# Patient Record
Sex: FEMALE | Race: BLACK OR AFRICAN AMERICAN | URBAN - METROPOLITAN AREA
[De-identification: names, ages, dates, MRNs, and addresses within clinical notes are randomized per-mention and may not be internally consistent; named-entity substitution may affect disease eponyms.]

---

## 2018-05-02 ENCOUNTER — OFFICE VISIT (OUTPATIENT)
Dept: FAMILY MEDICINE | Facility: CLINIC | Age: 28
End: 2018-05-02
Payer: COMMERCIAL

## 2018-05-02 VITALS
TEMPERATURE: 98.4 F | DIASTOLIC BLOOD PRESSURE: 66 MMHG | HEART RATE: 56 BPM | SYSTOLIC BLOOD PRESSURE: 102 MMHG | WEIGHT: 151 LBS

## 2018-05-02 DIAGNOSIS — Z32.01 PREGNANCY TEST POSITIVE: ICD-10-CM

## 2018-05-02 DIAGNOSIS — N92.6 MISSED PERIOD: Primary | ICD-10-CM

## 2018-05-02 LAB — BETA HCG QUAL IFA URINE: POSITIVE

## 2018-05-02 PROCEDURE — 84703 CHORIONIC GONADOTROPIN ASSAY: CPT | Performed by: FAMILY MEDICINE

## 2018-05-02 PROCEDURE — 99202 OFFICE O/P NEW SF 15 MIN: CPT | Performed by: FAMILY MEDICINE

## 2018-05-02 RX ORDER — PRENATAL VIT/IRON FUM/FOLIC AC 27MG-0.8MG
1 TABLET ORAL DAILY
Qty: 100 TABLET | Refills: 3 | Status: SHIPPED | OUTPATIENT
Start: 2018-05-02 | End: 2020-08-18

## 2018-05-02 NOTE — PROGRESS NOTES
SUBJECTIVE:   Salina Ulloa is a 27 year old female who presents to clinic today for the following health issues:      Concern - positive home preg test     Patient came today after she had her home pregnancy test positive.  Her last menstrual period was March 23.  She denies any current symptoms no vaginal bleeding no abdominal discomfort.  She had one pregnancy before which was uneventful.  She gave birth to her normal baby girl almost 3 years ago.  Denies any risk signs or symptoms.    Problem list and histories reviewed & adjusted, as indicated.      There is no problem list on file for this patient.    No past surgical history on file.    Social History   Substance Use Topics     Smoking status: Never Smoker     Smokeless tobacco: Never Used     Alcohol use No     No family history on file.      Current Outpatient Prescriptions   Medication Sig Dispense Refill     Prenatal Vit-Fe Fumarate-FA (PRENATAL MULTIVITAMIN PLUS IRON) 27-0.8 MG TABS per tablet Take 1 tablet by mouth daily 100 tablet 3       Reviewed and updated as needed this visit by clinical staff  Tobacco  Allergies  Meds  Soc Hx      Reviewed and updated as needed this visit by Provider         ROS:  CONSTITUTIONAL: NEGATIVE for fever, chills, change in weight  ENT/MOUTH: NEGATIVE for ear, mouth and throat problems  RESP: NEGATIVE for significant cough or SOB  CV: NEGATIVE for chest pain, palpitations or peripheral edema    OBJECTIVE:                                                    /66  Pulse 56  Temp 98.4  F (36.9  C) (Tympanic)  Wt 151 lb (68.5 kg)  LMP 03/23/2018  There is no height or weight on file to calculate BMI.   GENERAL: healthy, alert, well nourished, well hydrated, no distress  RESP: lungs clear to auscultation - no rales, no rhonchi, no wheezes  CV: regular rates and rhythm, normal S1 S2, no S3 or S4 and no murmur, no click or rub -         ASSESSMENT/PLAN:                                                         ICD-10-CM    1. Missed period N92.6 Beta HCG Qual, Urine - [facility] ([id number])     OB/GYN REFERRAL     Prenatal Vit-Fe Fumarate-FA (PRENATAL MULTIVITAMIN PLUS IRON) 27-0.8 MG TABS per tablet   2. Pregnancy test positive Z32.01 OB/GYN REFERRAL     Prenatal Vit-Fe Fumarate-FA (PRENATAL MULTIVITAMIN PLUS IRON) 27-0.8 MG TABS per tablet     Patient pregnancy test is positive.  We discussed about prenatal care.  She is referred to OB/GYN for further evaluation.  Meanwhile she is advised to keep herself hydrated and I will start her on prenatal vitamins.      Sylvester Clarke MD  [facility]

## 2018-05-02 NOTE — MR AVS SNAPSHOT
After Visit Summary   5/2/2018    Salina Ulloa    MRN: [id number]           Patient Information     Date Of Birth          1990        Visit Information        Provider Department      5/2/2018 11:00 AM Sylvester Clarke MD [facility] [place] [facility]        Today's Diagnoses     Missed period    -  1    Pregnancy test positive           Follow-ups after your visit        Additional Services     OB/GYN REFERRAL       Your provider has referred you to:  N: [facility] [phone]   [email or web address]    Please be aware that coverage of these services is subject to the terms and limitations of your health insurance plan.  Call member services at your health plan with any benefit or coverage questions.      Please bring the following to your appointment:  >>   Any x-rays, CTs or MRIs which have been performed.  Contact the facility where they were done to arrange for  prior to your scheduled appointment.  Any new CT, MRI or other procedures ordered by your specialist must be performed at a [facility] facility or coordinated by your clinic's referral office.    >>   List of current medications   >>   This referral request   >>   Any documents/labs given to you for this referral                  Who to contact     If you have questions or need follow up information about today's clinic visit or your schedule please contact [facility] directly at [phone].  Normal or non-critical lab and imaging results will be communicated to you by MyChart, letter or phone within 4 business days after the clinic has received the results. If you do not hear from us within 7 days, please contact the clinic through MyChart or phone. If you have a critical or abnormal lab result, we will notify you by phone as soon as possible.  Submit refill requests through [email or web address] or call your pharmacy and they will forward the refill request to us. Please allow 3 business days for  "your refill to be completed.          Additional Information About Your Visit        Catalyze[employer]CR Information     YouFig lets you send messages to your doctor, view your test results, renew your prescriptions, schedule appointments and more. To sign up, go to [email or web address] . Click on \"Log in\" on the left side of the screen, which will take you to the Welcome page. Then click on \"Sign up Now\" on the right side of the page.     You will be asked to enter the access code listed below, as well as some personal information. Please follow the directions to create your username and password.     Your access code is: [email or web address]  Expires: 2018 11:11 AM     Your access code will  in 90 days. If you need help or a new code, please call your [facility] clinic or [phone].        Care EveryWhere ID     This is your Care EveryWhere ID. This could be used by other organizations to access your [facility] medical records  [id number]        Your Vitals Were     Pulse Temperature Last Period             56 98.4  F (36.9  C) (Tympanic) 2018          Blood Pressure from Last 3 Encounters:   18 102/66    Weight from Last 3 Encounters:   18 151 lb (68.5 kg)              We Performed the Following     Beta HCG Qual, Urine - [facility] ([id number])     OB/GYN REFERRAL          Today's Medication Changes          These changes are accurate as of 18 11:11 AM.  If you have any questions, ask your nurse or doctor.               Start taking these medicines.        Dose/Directions    prenatal multivitamin plus iron 27-0.8 MG Tabs per tablet   Used for:  Pregnancy test positive, Missed period   Started by:  Sylvester Clarke MD        Dose:  1 tablet   Take 1 tablet by mouth daily   Quantity:  100 tablet   Refills:  3            Where to get your medicines      These medications were sent to [place] [address] [place] [facility]  [address]  [address] [place] [address]     Phone:  [phone] "     prenatal multivitamin plus iron 27-0.8 MG Tabs per tablet                Primary Care Provider Fax #    Provider Not In System [phone]                Equal Access to Services     HECTOR PARDO : Atnonia Ag, shara spangler, marlenyhansel songalexgissell phillipjana, cori schafereder phillipsanket elder laIsaacsamantha nicholson. [facility] [place] -[phone].    ATENCIÓN: Si habla español, tiene a kaiser disposición servicios gratuitos de asistencia lingüística. Llame al [phone].    We comply with applicable federal civil rights laws and [place] laws. We do not discriminate on the basis of race, color, national origin, age, disability, sex, sexual orientation, or gender identity.            Thank you!     Thank you for choosing [facility]  for your care. Our goal is always to provide you with excellent care. Hearing back from our patients is one way we can continue to improve our services. Please take a few minutes to complete the written survey that you may receive in the mail after your visit with us. Thank you!             Your Updated Medication List - Protect others around you: Learn how to safely use, store and throw away your medicines at www.disposemymeds.org.          This list is accurate as of 5/2/18 11:11 AM.  Always use your most recent med list.                   Brand Name Dispense Instructions for use Diagnosis    prenatal multivitamin plus iron 27-0.8 MG Tabs per tablet     100 tablet    Take 1 tablet by mouth daily    Pregnancy test positive, Missed period

## 2018-06-11 ENCOUNTER — TRANSFERRED RECORDS (OUTPATIENT)
Dept: HEALTH INFORMATION MANAGEMENT | Facility: CLINIC | Age: 28
End: 2018-06-11

## 2018-06-11 LAB
ABO + RH BLD: NORMAL
ABO + RH BLD: NORMAL
C TRACH DNA SPEC QL PROBE+SIG AMP: NEGATIVE
HBV SURFACE AG SERPL QL IA: NEGATIVE
HIV 1+2 AB+HIV1 P24 AG SERPL QL IA: NORMAL
N GONORRHOEA DNA SPEC QL PROBE+SIG AMP: NEGATIVE
RUBELLA ABY IGG: 5.93
RUBELLA ANTIBODY IGG QUANTITATIVE: NORMAL IU/ML
TREPONEMA ANTIBODIES: NORMAL

## 2018-09-17 LAB — T PALLIDUM IGG SER QL: NORMAL

## 2018-11-28 LAB — GROUP B STREP PCR: NEGATIVE

## 2018-12-16 ENCOUNTER — HOSPITAL ENCOUNTER (INPATIENT)
Facility: CLINIC | Age: 28
LOS: 2 days | Discharge: HOME | End: 2018-12-18
Attending: OBSTETRICS & GYNECOLOGY | Admitting: OBSTETRICS & GYNECOLOGY
Payer: COMMERCIAL

## 2018-12-16 LAB
ABO + RH BLD: NORMAL
ABO + RH BLD: NORMAL
RUPTURE OF FETAL MEMBRANES BY ROM PLUS: POSITIVE
SPECIMEN EXP DATE BLD: NORMAL
T PALLIDUM AB SER QL: NONREACTIVE

## 2018-12-16 PROCEDURE — 36415 COLL VENOUS BLD VENIPUNCTURE: CPT | Performed by: OBSTETRICS & GYNECOLOGY

## 2018-12-16 PROCEDURE — 86780 TREPONEMA PALLIDUM: CPT | Performed by: OBSTETRICS & GYNECOLOGY

## 2018-12-16 PROCEDURE — G0463 HOSPITAL OUTPT CLINIC VISIT: HCPCS | Mod: 25

## 2018-12-16 PROCEDURE — 84112 EVAL AMNIOTIC FLUID PROTEIN: CPT | Performed by: OBSTETRICS & GYNECOLOGY

## 2018-12-16 PROCEDURE — 86901 BLOOD TYPING SEROLOGIC RH(D): CPT | Performed by: OBSTETRICS & GYNECOLOGY

## 2018-12-16 PROCEDURE — 25000132 ZZH RX MED GY IP 250 OP 250 PS 637: Performed by: OBSTETRICS & GYNECOLOGY

## 2018-12-16 PROCEDURE — 86900 BLOOD TYPING SEROLOGIC ABO: CPT | Performed by: OBSTETRICS & GYNECOLOGY

## 2018-12-16 PROCEDURE — 72200001 ZZH LABOR CARE VAGINAL DELIVERY SINGLE

## 2018-12-16 PROCEDURE — 59025 FETAL NON-STRESS TEST: CPT

## 2018-12-16 PROCEDURE — 25000128 H RX IP 250 OP 636: Performed by: OBSTETRICS & GYNECOLOGY

## 2018-12-16 PROCEDURE — 12000037 ZZH R&B POSTPARTUM INTERMEDIATE

## 2018-12-16 RX ORDER — ACETAMINOPHEN 325 MG/1
650 TABLET ORAL EVERY 4 HOURS PRN
Status: DISCONTINUED | OUTPATIENT
Start: 2018-12-16 | End: 2018-12-16

## 2018-12-16 RX ORDER — BISACODYL 10 MG
10 SUPPOSITORY, RECTAL RECTAL DAILY PRN
Status: DISCONTINUED | OUTPATIENT
Start: 2018-12-18 | End: 2018-12-18 | Stop reason: HOSPADM

## 2018-12-16 RX ORDER — OXYTOCIN 10 [USP'U]/ML
10 INJECTION, SOLUTION INTRAMUSCULAR; INTRAVENOUS
Status: DISCONTINUED | OUTPATIENT
Start: 2018-12-16 | End: 2018-12-16

## 2018-12-16 RX ORDER — PRENATAL VIT/IRON FUM/FOLIC AC 27MG-0.8MG
1 TABLET ORAL DAILY
Status: DISCONTINUED | OUTPATIENT
Start: 2018-12-16 | End: 2018-12-18 | Stop reason: HOSPADM

## 2018-12-16 RX ORDER — HYDROCORTISONE 2.5 %
CREAM (GRAM) TOPICAL 3 TIMES DAILY PRN
Status: DISCONTINUED | OUTPATIENT
Start: 2018-12-16 | End: 2018-12-18 | Stop reason: HOSPADM

## 2018-12-16 RX ORDER — METHYLERGONOVINE MALEATE 0.2 MG/ML
200 INJECTION INTRAVENOUS
Status: DISCONTINUED | OUTPATIENT
Start: 2018-12-16 | End: 2018-12-16

## 2018-12-16 RX ORDER — AMOXICILLIN 250 MG
1 CAPSULE ORAL 2 TIMES DAILY
Status: DISCONTINUED | OUTPATIENT
Start: 2018-12-16 | End: 2018-12-18 | Stop reason: HOSPADM

## 2018-12-16 RX ORDER — NALOXONE HYDROCHLORIDE 0.4 MG/ML
.1-.4 INJECTION, SOLUTION INTRAMUSCULAR; INTRAVENOUS; SUBCUTANEOUS
Status: DISCONTINUED | OUTPATIENT
Start: 2018-12-16 | End: 2018-12-16

## 2018-12-16 RX ORDER — OXYTOCIN/0.9 % SODIUM CHLORIDE 30/500 ML
340 PLASTIC BAG, INJECTION (ML) INTRAVENOUS CONTINUOUS PRN
Status: DISCONTINUED | OUTPATIENT
Start: 2018-12-16 | End: 2018-12-18 | Stop reason: HOSPADM

## 2018-12-16 RX ORDER — OXYTOCIN 10 [USP'U]/ML
10 INJECTION, SOLUTION INTRAMUSCULAR; INTRAVENOUS
Status: DISCONTINUED | OUTPATIENT
Start: 2018-12-16 | End: 2018-12-18 | Stop reason: HOSPADM

## 2018-12-16 RX ORDER — SODIUM CHLORIDE, SODIUM LACTATE, POTASSIUM CHLORIDE, CALCIUM CHLORIDE 600; 310; 30; 20 MG/100ML; MG/100ML; MG/100ML; MG/100ML
INJECTION, SOLUTION INTRAVENOUS CONTINUOUS
Status: DISCONTINUED | OUTPATIENT
Start: 2018-12-16 | End: 2018-12-16

## 2018-12-16 RX ORDER — IBUPROFEN 400 MG/1
800 TABLET, FILM COATED ORAL
Status: DISCONTINUED | OUTPATIENT
Start: 2018-12-16 | End: 2018-12-16

## 2018-12-16 RX ORDER — ONDANSETRON 2 MG/ML
4 INJECTION INTRAMUSCULAR; INTRAVENOUS EVERY 6 HOURS PRN
Status: DISCONTINUED | OUTPATIENT
Start: 2018-12-16 | End: 2018-12-16

## 2018-12-16 RX ORDER — LANOLIN 100 %
OINTMENT (GRAM) TOPICAL
Status: DISCONTINUED | OUTPATIENT
Start: 2018-12-16 | End: 2018-12-18 | Stop reason: HOSPADM

## 2018-12-16 RX ORDER — IBUPROFEN 400 MG/1
800 TABLET, FILM COATED ORAL EVERY 6 HOURS PRN
Status: DISCONTINUED | OUTPATIENT
Start: 2018-12-16 | End: 2018-12-18 | Stop reason: HOSPADM

## 2018-12-16 RX ORDER — OXYCODONE AND ACETAMINOPHEN 5; 325 MG/1; MG/1
1 TABLET ORAL
Status: DISCONTINUED | OUTPATIENT
Start: 2018-12-16 | End: 2018-12-16

## 2018-12-16 RX ORDER — OXYCODONE HYDROCHLORIDE 5 MG/1
5 TABLET ORAL EVERY 4 HOURS PRN
Status: DISCONTINUED | OUTPATIENT
Start: 2018-12-16 | End: 2018-12-18 | Stop reason: HOSPADM

## 2018-12-16 RX ORDER — OXYTOCIN/0.9 % SODIUM CHLORIDE 30/500 ML
100 PLASTIC BAG, INJECTION (ML) INTRAVENOUS CONTINUOUS
Status: DISCONTINUED | OUTPATIENT
Start: 2018-12-16 | End: 2018-12-18 | Stop reason: HOSPADM

## 2018-12-16 RX ORDER — CARBOPROST TROMETHAMINE 250 UG/ML
250 INJECTION, SOLUTION INTRAMUSCULAR
Status: DISCONTINUED | OUTPATIENT
Start: 2018-12-16 | End: 2018-12-16

## 2018-12-16 RX ORDER — AMOXICILLIN 250 MG
2 CAPSULE ORAL 2 TIMES DAILY
Status: DISCONTINUED | OUTPATIENT
Start: 2018-12-16 | End: 2018-12-18 | Stop reason: HOSPADM

## 2018-12-16 RX ORDER — ACETAMINOPHEN 325 MG/1
650 TABLET ORAL EVERY 4 HOURS PRN
Status: DISCONTINUED | OUTPATIENT
Start: 2018-12-16 | End: 2018-12-18 | Stop reason: HOSPADM

## 2018-12-16 RX ORDER — OXYTOCIN/0.9 % SODIUM CHLORIDE 30/500 ML
100-340 PLASTIC BAG, INJECTION (ML) INTRAVENOUS CONTINUOUS PRN
Status: DISCONTINUED | OUTPATIENT
Start: 2018-12-16 | End: 2018-12-16

## 2018-12-16 RX ORDER — NALOXONE HYDROCHLORIDE 0.4 MG/ML
.1-.4 INJECTION, SOLUTION INTRAMUSCULAR; INTRAVENOUS; SUBCUTANEOUS
Status: DISCONTINUED | OUTPATIENT
Start: 2018-12-16 | End: 2018-12-18 | Stop reason: HOSPADM

## 2018-12-16 RX ORDER — FENTANYL CITRATE 50 UG/ML
50-100 INJECTION, SOLUTION INTRAMUSCULAR; INTRAVENOUS
Status: DISCONTINUED | OUTPATIENT
Start: 2018-12-16 | End: 2018-12-16

## 2018-12-16 RX ADMIN — IBUPROFEN 800 MG: 400 TABLET ORAL at 14:56

## 2018-12-16 RX ADMIN — IBUPROFEN 800 MG: 400 TABLET ORAL at 08:21

## 2018-12-16 RX ADMIN — SENNOSIDES AND DOCUSATE SODIUM 1 TABLET: 8.6; 5 TABLET ORAL at 19:57

## 2018-12-16 RX ADMIN — ACETAMINOPHEN 650 MG: 325 TABLET, FILM COATED ORAL at 21:04

## 2018-12-16 RX ADMIN — IBUPROFEN 800 MG: 400 TABLET ORAL at 21:04

## 2018-12-16 RX ADMIN — ACETAMINOPHEN 650 MG: 325 TABLET, FILM COATED ORAL at 08:21

## 2018-12-16 RX ADMIN — ACETAMINOPHEN 650 MG: 325 TABLET, FILM COATED ORAL at 14:56

## 2018-12-16 RX ADMIN — OXYTOCIN 10 UNITS: 10 INJECTION, SOLUTION INTRAMUSCULAR; INTRAVENOUS at 07:00

## 2018-12-16 ASSESSMENT — PAIN DESCRIPTION - DESCRIPTORS: DESCRIPTORS: CRAMPING

## 2018-12-16 NOTE — PLAN OF CARE
Data: Salina Ulloa transferred to 410 via wheelchair at 0900. Baby transferred via parent's arms.  Action: Receiving unit notified of transfer: Yes. Patient and family notified of room change. Report given to Alisha TUTTLE RN  at 0905. Belongings sent to receiving unit. Accompanied by Registered Nurse. Oriented patient to surroundings. Call light within reach. ID bands double-checked with receiving RN.  Response: Patient tolerated transfer and is stable.

## 2018-12-16 NOTE — PLAN OF CARE
0553-Dr. MARRY Cates notified of SVE, patient continues to desire unmedicated labor, increase in contractions and history of going quickly from patient with first.

## 2018-12-16 NOTE — PROGRESS NOTES
0330: Report taken from MAC RN.    0400: FHT, moderate variability, active, no decels  0415: pt c/o pain in lower abdomen and back, ambulating halls, using birthing ball, and breathing through contractions  515-0700: RN remains present at bedside providing support, assisting in position changes, and monitoring FHT's and labor progression  0624: Dr. JIL Cates bedside   0635: SVE 5cm, per Dr. JIL Cates. Provider remains in department until delivery  0654: pt noted to be bearing down, MD bedside.   0656Pt positioned onto back, head visualized on perineum and spontaneously delivered.   0700: placenta delivered.  0710: report given to Kaleigh RONQUILLO RN and care assumed.

## 2018-12-16 NOTE — L&D DELIVERY NOTE
Delivery Date:  2018      DIAGNOSIS: A 38-2/7 week gestation, spontaneous rupture of membranes, labor.      PROCEDURE:  Normal spontaneous vaginal delivery.      ANESTHESIA:  None.      ESTIMATED BLOOD LOSS:  100 mL.      FINDINGS:  Liveborn female infant, weight was 6 pounds 8 ounces, Apgar scores were 8 and 9 at 1 and 5 minutes respectively.  There were no lacerations.        HISTORY: The patient is a 28-year-old  2, para 1, who presented at 38-2/7 weeks' gestation with spontaneous rupture of membranes.  Her prenatal course was uncomplicated.  Estimated fetal weight was 6-1/2 pounds.  She progressed into spontaneous labor.  Nonstress test was reactive on presentation.  She made rapid progress to go from 1 cm to 5 cm and soon after was complete.      DESCRIPTION OF PROCEDURE:  The patient had a strong urge to push and pushed one time to deliver the entire baby.  No maneuvers were required.  The baby was immediately crying and placed on the patient's abdomen.  Cord clamping was delayed by 1 minute, at which time the cord was clamped and cut by the family.  The placenta delivered spontaneously, was found to be intact with a 3-vessel cord.  The perineum was inspected and noted to be intact.  The patient tolerated the procedure without difficulty and she remained in her delivery room in stable condition.  Sponge, lap and needle counts were correct.         NISSA BENNETT MD             D: 2018   T: 2018   MT: CROW      Name:     JEFFREY JORDAN   MRN:      [id number]        Account:        [id number]   :      1990        Delivery Date:  2018               Document: [id number]

## 2018-12-16 NOTE — PLAN OF CARE
Patient progressed through labor without complications.  Un-medicated as desired.   @7018 female.  Pitocin IM given after delivery of placenta.  Patient stable.

## 2018-12-16 NOTE — PLAN OF CARE
Vss.  Intermittent fetal monitoring.  Category 1 tracing.  Contractions every 5-7 minutes.  SROM at 0130.  Voiding.  Patient ambulating halls and using birthing ball for comfort. Pain in abdomen and in lower back during contractions.  Breathing through contractions at times.

## 2018-12-16 NOTE — PLAN OF CARE
Pt presents to PP room.  Pt oriented to room and safety measures.  Bends checked.  Vss, afebrile.  Fundus, firm, scant lochia.  Pain well managed with pain meds.  Pt comfortable with baby cares.  Will continue to monitor and assess.

## 2018-12-16 NOTE — H&P
No significant change in general health status based on examination of the patient, review of Nursing Admission Database and prenatal record.    EFW: 6lb 8oz    Salina Ulloa is a 28 year old  @38w2d who presents with SROM, progressed spontaneously into labor.  /-2  FHT Category 1    Cont exp mgmt.    Lesli Cates

## 2018-12-16 NOTE — PLAN OF CARE
Pt to MAC for SROM eval. TOCO and EFM applied. ROM+ collected and sent. Cervix 1/50/-2. ROM+ positive. Dr JIL Cates updated on status. Orders received. Plan reviewed with and spouse. Verbalized understanding and agreement. Pt offered . Declines at this time. Moved to room 220. Report to Jessica WALL RN and Nettie RONQUILLO RN to assume care.

## 2018-12-17 PROCEDURE — 12000037 ZZH R&B POSTPARTUM INTERMEDIATE

## 2018-12-17 PROCEDURE — 25000132 ZZH RX MED GY IP 250 OP 250 PS 637: Performed by: OBSTETRICS & GYNECOLOGY

## 2018-12-17 RX ADMIN — ACETAMINOPHEN 650 MG: 325 TABLET, FILM COATED ORAL at 16:03

## 2018-12-17 RX ADMIN — PRENATAL VIT W/ FE FUMARATE-FA TAB 27-0.8 MG 1 TABLET: 27-0.8 TAB at 08:22

## 2018-12-17 RX ADMIN — IBUPROFEN 800 MG: 400 TABLET ORAL at 22:06

## 2018-12-17 RX ADMIN — SENNOSIDES AND DOCUSATE SODIUM 1 TABLET: 8.6; 5 TABLET ORAL at 08:22

## 2018-12-17 RX ADMIN — ACETAMINOPHEN 650 MG: 325 TABLET, FILM COATED ORAL at 22:06

## 2018-12-17 RX ADMIN — IBUPROFEN 800 MG: 400 TABLET ORAL at 16:03

## 2018-12-17 RX ADMIN — IBUPROFEN 800 MG: 400 TABLET ORAL at 03:06

## 2018-12-17 RX ADMIN — ACETAMINOPHEN 650 MG: 325 TABLET, FILM COATED ORAL at 08:22

## 2018-12-17 RX ADMIN — IBUPROFEN 800 MG: 400 TABLET ORAL at 08:22

## 2018-12-17 RX ADMIN — ACETAMINOPHEN 650 MG: 325 TABLET, FILM COATED ORAL at 03:06

## 2018-12-17 NOTE — LACTATION NOTE
Attempted to visit.  Pt on phone visiting, offered to visit another time.  Will attempt to visit again tomorrow.  Pt is breast and bottle feeding.   Lidia Parrish  RN, IBCLC

## 2018-12-17 NOTE — PLAN OF CARE
Vital signs stable. Up ad iman. Voiding without difficulties. Bleeding wnl. Using ice and tucks. Taking tylenol and ibuprofen for pain control. Breast feeding okay, infant sleepy at breast and patient is bottle feeding infant formula periodically. Given option to pump but she declines at this time. Encouraged to call with any questions or concerns. Will continue to monitor.

## 2018-12-17 NOTE — PLAN OF CARE
Vital signs stable. Patient voiding without difficulty. Able to ambulate in room free of dizziness. Taking tylenol and ibuprofen for pain management. Bottle feeding infant every 2-3 hours. Completing infant cares independently. Encouraged to call with questions or concerns. Will continue to monitor.

## 2018-12-18 ENCOUNTER — DOCUMENTATION ONLY (OUTPATIENT)
Dept: CARE COORDINATION | Facility: CLINIC | Age: 28
End: 2018-12-18

## 2018-12-18 VITALS
RESPIRATION RATE: 16 BRPM | DIASTOLIC BLOOD PRESSURE: 67 MMHG | TEMPERATURE: 98.3 F | SYSTOLIC BLOOD PRESSURE: 115 MMHG | HEART RATE: 57 BPM

## 2018-12-18 PROCEDURE — 25000132 ZZH RX MED GY IP 250 OP 250 PS 637: Performed by: OBSTETRICS & GYNECOLOGY

## 2018-12-18 RX ADMIN — ACETAMINOPHEN 650 MG: 325 TABLET, FILM COATED ORAL at 03:56

## 2018-12-18 RX ADMIN — IBUPROFEN 800 MG: 400 TABLET ORAL at 09:24

## 2018-12-18 RX ADMIN — ACETAMINOPHEN 650 MG: 325 TABLET, FILM COATED ORAL at 09:24

## 2018-12-18 RX ADMIN — IBUPROFEN 800 MG: 400 TABLET ORAL at 03:56

## 2018-12-18 RX ADMIN — SENNOSIDES AND DOCUSATE SODIUM 1 TABLET: 8.6; 5 TABLET ORAL at 07:44

## 2018-12-18 RX ADMIN — PRENATAL VIT W/ FE FUMARATE-FA TAB 27-0.8 MG 1 TABLET: 27-0.8 TAB at 07:43

## 2018-12-18 NOTE — PLAN OF CARE
VSS. Voiding without difficulty. Scant vaginal bleeding. Pain controlled with ibuprofen and tylenol. Breast and bottle feeding infant independently. Will continue to monitor.

## 2018-12-18 NOTE — PROGRESS NOTES
PPD2  Feels well without concerns  /67   Pulse 57   Temp 98.3  F (36.8  C) (Axillary)   Resp 16   LMP 2018   Breastfeeding? Unknown    NAD  Abd Soft, NT    PPD2 s/p   discharge home today  F/u 6 wks  Lesli Cates

## 2018-12-18 NOTE — PROGRESS NOTES
[facility] will be sharing updates with you on Maternal Child Health Referral requests for home care services.  This is for care coordination purposes and alert you to referral status.  We received the referral for  Salina Ulloa; MRN [id number] and want to update you:    [facility] is unable to see patient for postpartum/  assessment and education due to patients BCBS Out of State insurance not contracted with [facility] for this service. Patient advised to contact their insurance provider to determine if service is covered through another homecare agency.   Offered option of private pay nurse assessment and education for mom or baby at service rate of 150.00 per visit or 180.00 for both.  Provided call back information if private pay visit is requested.        Sincerely AYDE SEPULVEDA  [phone]

## 2018-12-18 NOTE — LACTATION NOTE
Met with Salina.  Breast feeding is going well.  She said she has no issues latching.  Bottle feeding formula after breast feeding.  She had no questions or concerns and plans to keep breast feeding until her milk comes in.  Reviewed outpatient lactation resources for follow up when discharged if having any concerns.   Lidia Parrish  RN, IBCLC

## 2018-12-18 NOTE — PROGRESS NOTES
December 17, 2018      SUBJECTIVE: No acute overnight events.  Mild abdominal cramping. +Lochia light.  Tolerating PO. Ambulating/urinating w/o difficulty.  Denies CP/palp/SOB/LH.    OBJECTIVE:  /67   Pulse 57   Temp 98.3  F (36.8  C) (Axillary)   Resp 16   LMP 03/23/2018   Breastfeeding? Unknown   Gen: NAD, A&O x 3  Abd: Uterus firm, contracted, NT  Ext: minimal edema BL LEs, symmetric, no CT    No results found for: HGB]    A/P: PPD#1 s/p normal vaginal delivery.  Doing well.  Afebrile, VSS.  - ibuprofen, tylenol prn pain  - breastfeeding  - regular diet as tolerated  - routine postpartum care      THA MARTINS MD

## 2018-12-18 NOTE — PLAN OF CARE
Feels well. Vitals stable.   Oral pain medications working well.Breast and bottle feeding. Ready for discharge home with baby.

## 2018-12-18 NOTE — PLAN OF CARE
Pt told RN that infant does not sleep in basinet and will sleep with her in bed tonight. RN educated pt on safe sleep habits and that infant should not be sleeping in bed with anyone, but should be sleeping in the basinet. RN offered to take infant to the nursery if infant is keeping pt awake. Pt agrees to plan and will let RN know if she wants infant to go to nursery overnight.

## 2020-07-23 ENCOUNTER — OFFICE VISIT (OUTPATIENT)
Dept: URGENT CARE | Facility: URGENT CARE | Age: 30
End: 2020-07-23
Payer: COMMERCIAL

## 2020-07-23 VITALS
RESPIRATION RATE: 16 BRPM | TEMPERATURE: 97.6 F | HEART RATE: 70 BPM | SYSTOLIC BLOOD PRESSURE: 118 MMHG | DIASTOLIC BLOOD PRESSURE: 77 MMHG | OXYGEN SATURATION: 96 % | WEIGHT: 169.6 LBS

## 2020-07-23 DIAGNOSIS — S29.011A INTERCOSTAL MUSCLE STRAIN, INITIAL ENCOUNTER: Primary | ICD-10-CM

## 2020-07-23 PROCEDURE — 99213 OFFICE O/P EST LOW 20 MIN: CPT | Performed by: FAMILY MEDICINE

## 2020-07-23 RX ORDER — ACETAMINOPHEN 325 MG/1
325-650 TABLET ORAL EVERY 6 HOURS PRN
COMMUNITY
End: 2020-08-18

## 2020-07-23 RX ORDER — IBUPROFEN 200 MG
200 TABLET ORAL EVERY 4 HOURS PRN
COMMUNITY
End: 2020-08-18

## 2020-07-23 ASSESSMENT — ENCOUNTER SYMPTOMS
SHORTNESS OF BREATH: 0
HEADACHES: 0
RHINORRHEA: 0
SORE THROAT: 0
FEVER: 0
DIARRHEA: 0
COUGH: 0
VOMITING: 0
NAUSEA: 0
CHILLS: 0

## 2020-07-23 NOTE — LETTER
[address] [address]  Phone: [phone]    July 23, 2020        Salina Ulloa  [address]  [facility] [address]          To whom it may concern:    RE: Salina Ulloa    Patient was seen and treated today at our urgent care clinic.  She is having intercostal muscle area tenderness along the ribs possibly related to twisting motions which seem to make this worse.  I encouraged her to stay home and rest tomorrow and then return to her next scheduled shift try to promote less twisting or may be utilizing a chair that can twist for her while she keeps her spine and posture somewhat straightforward.    I would expect this to gradually improve with time she can try ibuprofen in the short-term over the next 3 to 5 days for ongoing tenderness.  Her exam was otherwise reassuring.  I expect this to improve within the next 3 to 5 days.Follow-up if symptoms persist or worsen with her primary care doctor.         Sincerely,          Kirby Vázquez MD

## 2020-07-23 NOTE — PROGRESS NOTES
SUBJECTIVE:   Salina Ulloa is a 30 year old female presenting with a chief complaint of   Chief Complaint   Patient presents with     URI     Runny nose, pain in ribs when blowing her nose x1 week. Feeling movement in her stomach but states she is not pregnant        She is an established patient of [facility].    Salina is a 30-year-old female presenting today with bilateral rib area pain anteriorly just beneath both breasts and symmetrical in location.  This pain is been present for approximately 2 days.  She indicates that the pain is worse with twisting or movement better at rest seems to be subaxillary or adjacent to the armpit area.     She denies any breast lumps or bumps or breast tenderness.  Denies any previous concern for breast lumps or bumps or tenderness.  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     Review of Systems   Constitutional: Negative for chills and fever.   HENT: Negative for congestion, ear pain, rhinorrhea and sore throat.    Respiratory: Negative for cough and shortness of breath.    Gastrointestinal: Negative for diarrhea, nausea and vomiting.   Musculoskeletal:        Rib pain per HPI   Neurological: Negative for headaches.       History reviewed. No pertinent past medical history.  History reviewed. No pertinent family history.  Current Outpatient Medications   Medication Sig Dispense Refill     acetaminophen (TYLENOL) 325 MG tablet Take 325-650 mg by mouth every 6 hours as needed for mild pain       ibuprofen (ADVIL/MOTRIN) 200 MG tablet Take 200 mg by mouth every 4 hours as needed for mild pain       Prenatal Vit-Fe Fumarate-FA (PRENATAL MULTIVITAMIN PLUS IRON) 27-0.8 MG TABS per tablet Take 1 tablet by mouth daily (Patient not taking: Reported on 7/23/2020) 100 tablet 3     Social History     Tobacco Use     Smoking status: Never Smoker     Smokeless tobacco: Never Used   Substance Use Topics     Alcohol use: No       OBJECTIVE  /77   Pulse 70   Temp 97.6  F (36.4  C)  (Tympanic)   Resp 16   Wt 76.9 kg (169 lb 9.6 oz)   LMP 06/28/2020   SpO2 96%     Physical Exam  HENT:      Head: Normocephalic and atraumatic.      Right Ear: External ear normal.      Left Ear: External ear normal.      Nose: Nose normal.      Mouth/Throat:      Pharynx: No oropharyngeal exudate.   Eyes:      General: No scleral icterus.        Right eye: No discharge.         Left eye: No discharge.      Conjunctiva/sclera: Conjunctivae normal.      Pupils: Pupils are equal, round, and reactive to light.   Neck:      Musculoskeletal: Neck supple.      Thyroid: No thyromegaly.      Trachea: No tracheal deviation.   Cardiovascular:      Rate and Rhythm: Normal rate and regular rhythm.      Heart sounds: Normal heart sounds. No murmur. No friction rub. No gallop.    Pulmonary:      Effort: Pulmonary effort is normal. No respiratory distress.      Breath sounds: Normal breath sounds. No stridor. No wheezing or rales.   Chest:      Chest wall: No tenderness.   Abdominal:      General: Bowel sounds are normal. There is no distension.      Palpations: Abdomen is soft. There is no mass.      Tenderness: There is no abdominal tenderness. There is no guarding or rebound.   Musculoskeletal:         General: No tenderness or deformity.      Comments: Classic intercostal area tenderness to palpation made worse with twisting.   Lymphadenopathy:      Cervical: No cervical adenopathy.   Skin:     General: Skin is warm and dry.      Findings: No erythema or rash.   Neurological:      Mental Status: She is alert and oriented to person, place, and time.      Cranial Nerves: No cranial nerve deficit.   Psychiatric:         Judgment: Judgment normal.             ASSESSMENT:    ICD-10-CM    1. 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  He is missed 5 previous days this week already I did give  her an additional day off to rest she can rest through the weekend as she does not work recommend she return to work on Monday hopefully at some point much improved by then  Can also use Tylenol or ibuprofen for ongoing pain.  Kirby Vázquez MD

## 2020-08-18 ENCOUNTER — OFFICE VISIT (OUTPATIENT)
Dept: FAMILY MEDICINE | Facility: CLINIC | Age: 30
End: 2020-08-18
Payer: COMMERCIAL

## 2020-08-18 VITALS
HEART RATE: 70 BPM | RESPIRATION RATE: 16 BRPM | OXYGEN SATURATION: 100 % | WEIGHT: 170 LBS | TEMPERATURE: 97.4 F | HEIGHT: 67 IN | DIASTOLIC BLOOD PRESSURE: 77 MMHG | SYSTOLIC BLOOD PRESSURE: 112 MMHG | BODY MASS INDEX: 26.68 KG/M2

## 2020-08-18 DIAGNOSIS — Z30.09 ENCOUNTER FOR OTHER GENERAL COUNSELING OR ADVICE ON CONTRACEPTION: ICD-10-CM

## 2020-08-18 DIAGNOSIS — Z00.00 ROUTINE GENERAL MEDICAL EXAMINATION AT A HEALTH CARE FACILITY: Primary | ICD-10-CM

## 2020-08-18 DIAGNOSIS — Z11.3 ROUTINE SCREENING FOR STI (SEXUALLY TRANSMITTED INFECTION): ICD-10-CM

## 2020-08-18 DIAGNOSIS — R19.7 DIARRHEA, UNSPECIFIED TYPE: ICD-10-CM

## 2020-08-18 DIAGNOSIS — Z12.4 SCREENING FOR MALIGNANT NEOPLASM OF CERVIX: ICD-10-CM

## 2020-08-18 LAB
GLUCOSE BLD-MCNC: 68 MG/DL (ref 70–99)
HGB BLD-MCNC: 12.8 G/DL (ref 11.7–15.7)
SPECIMEN SOURCE: NORMAL
WET PREP SPEC: NORMAL

## 2020-08-18 PROCEDURE — 99213 OFFICE O/P EST LOW 20 MIN: CPT | Mod: 25 | Performed by: NURSE PRACTITIONER

## 2020-08-18 PROCEDURE — 86780 TREPONEMA PALLIDUM: CPT | Performed by: NURSE PRACTITIONER

## 2020-08-18 PROCEDURE — 87340 HEPATITIS B SURFACE AG IA: CPT | Performed by: NURSE PRACTITIONER

## 2020-08-18 PROCEDURE — 87491 CHLMYD TRACH DNA AMP PROBE: CPT | Performed by: NURSE PRACTITIONER

## 2020-08-18 PROCEDURE — 84443 ASSAY THYROID STIM HORMONE: CPT | Performed by: NURSE PRACTITIONER

## 2020-08-18 PROCEDURE — 87591 N.GONORRHOEAE DNA AMP PROB: CPT | Performed by: NURSE PRACTITIONER

## 2020-08-18 PROCEDURE — 36415 COLL VENOUS BLD VENIPUNCTURE: CPT | Performed by: NURSE PRACTITIONER

## 2020-08-18 PROCEDURE — 85018 HEMOGLOBIN: CPT | Performed by: NURSE PRACTITIONER

## 2020-08-18 PROCEDURE — 82306 VITAMIN D 25 HYDROXY: CPT | Performed by: NURSE PRACTITIONER

## 2020-08-18 PROCEDURE — 87210 SMEAR WET MOUNT SALINE/INK: CPT | Performed by: NURSE PRACTITIONER

## 2020-08-18 PROCEDURE — 80061 LIPID PANEL: CPT | Performed by: NURSE PRACTITIONER

## 2020-08-18 PROCEDURE — G0145 SCR C/V CYTO,THINLAYER,RESCR: HCPCS | Performed by: NURSE PRACTITIONER

## 2020-08-18 PROCEDURE — 99395 PREV VISIT EST AGE 18-39: CPT | Performed by: NURSE PRACTITIONER

## 2020-08-18 PROCEDURE — 87389 HIV-1 AG W/HIV-1&-2 AB AG IA: CPT | Performed by: NURSE PRACTITIONER

## 2020-08-18 PROCEDURE — 82947 ASSAY GLUCOSE BLOOD QUANT: CPT | Performed by: NURSE PRACTITIONER

## 2020-08-18 PROCEDURE — 87624 HPV HI-RISK TYP POOLED RSLT: CPT | Performed by: NURSE PRACTITIONER

## 2020-08-18 RX ORDER — FAMOTIDINE 40 MG/1
40 TABLET, FILM COATED ORAL 2 TIMES DAILY
Qty: 60 TABLET | Refills: 2 | Status: ON HOLD | OUTPATIENT
Start: 2020-08-18 | End: 2021-04-25

## 2020-08-18 ASSESSMENT — MIFFLIN-ST. JEOR: SCORE: 1505.8

## 2020-08-18 ASSESSMENT — PAIN SCALES - GENERAL: PAINLEVEL: NO PAIN (0)

## 2020-08-18 NOTE — PATIENT INSTRUCTIONS
Consider the copper IUD (called Paragard)      Preventive Health Recommendations  Female Ages 26 - 39  Yearly exam:   See your health care provider every year in order to    Review health changes.     Discuss preventive care.      Review your medicines if you your doctor has prescribed any.    Until age 30: Get a Pap test every three years (more often if you have had an abnormal result).    After age 30: Talk to your doctor about whether you should have a Pap test every 3 years or have a Pap test with HPV screening every 5 years.   You do not need a Pap test if your uterus was removed (hysterectomy) and you have not had cancer.  You should be tested each year for STDs (sexually transmitted diseases), if you're at risk.   Talk to your provider about how often to have your cholesterol checked.  If you are at risk for diabetes, you should have a diabetes test (fasting glucose).  Shots: Get a flu shot each year. Get a tetanus shot every 10 years.   Nutrition:     Eat at least 5 servings of fruits and vegetables each day.    Eat whole-grain bread, whole-wheat pasta and brown rice instead of white grains and rice.    Get adequate Calcium and Vitamin D.     Lifestyle    Exercise at least 150 minutes a week (30 minutes a day, 5 days of the week). This will help you control your weight and prevent disease.    Limit alcohol to one drink per day.    No smoking.     Wear sunscreen to prevent skin cancer.    See your dentist every six months for an exam and cleaning.

## 2020-08-18 NOTE — LETTER
August 27, 2020    Salina Ulloa  [address]  [place] [address]    Dear ,  This letter is regarding your recent Pap smear (cervical cancer screening) and Human Papillomavirus (HPV) test.  We are happy to inform you that your Pap smear result is normal. Cervical cancer is closely linked with certain types of HPV. Your results showed no evidence of high-risk HPV.  We recommend you have your next PAP smear and HPV test in 5 years.  You will still need to return to the clinic every year for an annual exam and other preventive tests.  If you have additional questions regarding this result, please call our registered nurse, Julee at [phone].  Sincerely,    KRISTY Rolon CNP/rlm

## 2020-08-18 NOTE — PROGRESS NOTES
"   SUBJECTIVE:   CC: Salina Ulloa is an 30 year old woman who presents for preventive health visit.     Healthy Habits:    Do you get at least three servings of calcium containing foods daily (dairy, green leafy vegetables, etc.)? yes    Amount of exercise or daily activities, outside of work: 2 day(s) per week    Problems taking medications regularly No    Medication side effects: No    Have you had an eye exam in the past two years? no    Do you see a dentist twice per year? no    Do you have sleep apnea, excessive snoring or daytime drowsiness?no    Patient's last menstrual period was 07/28/2020 (exact date).    Stomach and throat \"with creepy thing\".  Has been happening x 4 years.  States can \"feel it moving\".  Didn't feel it during pregnancy.  Normal bowel movements.  Some diarrhea last week.  Happens when she eats something.      covid test two weeks ago negative    Has two children.   wants more, she does not.  He does not help with kids so she feels she cannot handle another preganncy.  No history of using birth control  .          Today's PHQ-2 Score:   PHQ-2 ( 1999 Pfizer) 8/18/2020   Q1: Little interest or pleasure in doing things 0   Q2: Feeling down, depressed or hopeless 0   PHQ-2 Score 0       Abuse: Current or Past(Physical, Sexual or Emotional)- No  Do you feel safe in your environment? Yes        Social History     Tobacco Use     Smoking status: Never Smoker     Smokeless tobacco: Never Used   Substance Use Topics     Alcohol use: No     If you drink alcohol do you typically have >3 drinks per day or >7 drinks per week? No                     Reviewed orders with patient.  Reviewed health maintenance and updated orders accordingly - Yes  BP Readings from Last 3 Encounters:   08/18/20 112/77   07/23/20 118/77   12/18/18 115/67    Wt Readings from Last 3 Encounters:   08/18/20 77.1 kg (170 lb)   07/23/20 76.9 kg (169 lb 9.6 oz)   05/02/18 68.5 kg (151 lb)                  Patient Active " Problem List   Diagnosis     Indication for care in labor or delivery      (normal spontaneous vaginal delivery)     Past Surgical History:   Procedure Laterality Date     NO HISTORY OF SURGERY         Social History     Tobacco Use     Smoking status: Never Smoker     Smokeless tobacco: Never Used   Substance Use Topics     Alcohol use: No     History reviewed. No pertinent family history.      Current Outpatient Medications   Medication Sig Dispense Refill     famotidine (PEPCID) 40 MG tablet Take 1 tablet (40 mg) by mouth 2 times daily 60 tablet 2     No Known Allergies    Mammogram not appropriate for this patient based on age.    Pertinent mammograms are reviewed under the imaging tab.  History of abnormal Pap smear: NO - age 30-65 PAP every 5 years with negative HPV co-testing recommended     Reviewed and updated as needed this visit by clinical staff  Tobacco  Allergies  Meds  Med Hx  Surg Hx  Fam Hx  Soc Hx        Reviewed and updated as needed this visit by Provider        History reviewed. No pertinent past medical history.   Past Surgical History:   Procedure Laterality Date     NO HISTORY OF SURGERY       OB History    Para Term  AB Living   2 2 2 0 0 2   SAB TAB Ectopic Multiple Live Births   0 0 0 0 2      # Outcome Date GA Lbr Martin/2nd Weight Sex Delivery Anes PTL Lv   2 Term 18 38w2d 03:26 2.948 kg (6 lb 8 oz) F Vag-Spont None N ELLY      Name: LESLY JORDAN      Apgar1: 8  Apgar5: 9   1 Term     F Vag-Spont None N ELLY       ROS:  CONSTITUTIONAL: NEGATIVE for fever, chills, change in weight  INTEGUMENTARU/SKIN: NEGATIVE for worrisome rashes, moles or lesions  EYES: NEGATIVE for vision changes or irritation  ENT: NEGATIVE for ear, mouth and throat problems  RESP: NEGATIVE for significant cough or SOB  BREAST: NEGATIVE for masses, tenderness or discharge  CV: NEGATIVE for chest pain, palpitations or peripheral edema  GI: see above  : NEGATIVE for unusual urinary or  "vaginal symptoms. Periods are regular.  MUSCULOSKELETAL: NEGATIVE for significant arthralgias or myalgia  NEURO: NEGATIVE for weakness, dizziness or paresthesias  PSYCHIATRIC: NEGATIVE for changes in mood or affect    OBJECTIVE:   /77 (BP Location: Left arm, Patient Position: Chair, Cuff Size: Adult Regular)   Pulse 70   Temp 97.4  F (36.3  C) (Oral)   Resp 16   Ht 1.689 m (5' 6.5\")   Wt 77.1 kg (170 lb)   LMP 07/28/2020 (Exact Date)   SpO2 100%   Breastfeeding No   BMI 27.03 kg/m    EXAM:  GENERAL: healthy, alert and no distress  EYES: Eyes grossly normal to inspection, PERRL and conjunctivae and sclerae normal  HENT: ear canals and TM's normal, nose and mouth without ulcers or lesions  NECK: no adenopathy, no asymmetry, masses, or scars and thyroid normal to palpation  RESP: lungs clear to auscultation - no rales, rhonchi or wheezes  BREAST: normal without masses, tenderness or nipple discharge and no palpable axillary masses or adenopathy  CV: regular rate and rhythm, normal S1 S2, no S3 or S4, no murmur, click or rub, no peripheral edema and peripheral pulses strong  ABDOMEN: soft, nontender, no hepatosplenomegaly, no masses and bowel sounds normal   (female): normal female external genitalia, normal urethral meatus, vaginal mucosa pink, moist, well rugated, and normal cervix/adnexa/uterus without masses or discharge  MS: no gross musculoskeletal defects noted, no edema  SKIN: no suspicious lesions or rashes  NEURO: Normal strength and tone, mentation intact and speech normal  PSYCH: mentation appears normal, affect normal/bright    Diagnostic Test Results:  Labs reviewed in Epic  Results for orders placed or performed in visit on 08/18/20   Lipid panel reflex to direct LDL Non-fasting     Status: Abnormal   Result Value Ref Range    Cholesterol 246 (H) <200 mg/dL    Triglycerides 80 <150 mg/dL    HDL Cholesterol 58 >49 mg/dL    LDL Cholesterol Calculated 172 (H) <100 mg/dL    Non HDL " Cholesterol 188 (H) <130 mg/dL   TSH with free T4 reflex     Status: None   Result Value Ref Range    TSH 2.40 0.40 - 4.00 mU/L   Glucose, whole blood     Status: Abnormal   Result Value Ref Range    Glucose Whole Blood 68 (L) 70 - 99 mg/dL   Hemoglobin     Status: None   Result Value Ref Range    Hemoglobin 12.8 11.7 - 15.7 g/dL   Vitamin D Deficiency     Status: None   Result Value Ref Range    Vitamin D Deficiency screening 20 20 - 75 ug/L   HIV Antigen Antibody Combo     Status: None   Result Value Ref Range    HIV Antigen Antibody Combo Nonreactive NR^Nonreactive       Hepatitis B surface antigen     Status: None   Result Value Ref Range    Hep B Surface Agn Nonreactive NR^Nonreactive   Treponema Abs w Reflex to RPR and Titer     Status: None   Result Value Ref Range    Treponema Antibodies Nonreactive NR^Nonreactive   NEISSERIA GONORRHOEA PCR     Status: None    Specimen: Cervix   Result Value Ref Range    Specimen Descrip Cervix     N Gonorrhea PCR Negative NEG^Negative   CHLAMYDIA TRACHOMATIS PCR     Status: None    Specimen: Cervix   Result Value Ref Range    Specimen Description Cervix     Chlamydia Trachomatis PCR Negative NEG^Negative   Wet prep     Status: None    Specimen: Vagina   Result Value Ref Range    Specimen Description Vagina     Wet Prep No Trichomonas seen     Wet Prep No clue cells seen     Wet Prep No yeast seen     Wet Prep No WBC's seen        ASSESSMENT/PLAN:   1. Routine general medical examination at a health care facility  - Lipid panel reflex to direct LDL Non-fasting  - TSH with free T4 reflex  - Glucose, whole blood  - Hemoglobin  - Vitamin D Deficiency    2. Diarrhea, unspecified type  Unclear etiology.    - Helicobacter pylori Antigen Stool; Future  - Ova and Parasite Exam Routine; Future  - famotidine (PEPCID) 40 MG tablet; Take 1 tablet (40 mg) by mouth 2 times daily  Dispense: 60 tablet; Refill: 2    3. Encounter for other general counseling or advice on  "contraception  Discussed all methods.  Patient liked the idea of [place].  Will schedule if desires.    4. Routine screening for STI (sexually transmitted infection)  - HIV Antigen Antibody Combo  - Hepatitis B surface antigen  - NEISSERIA GONORRHOEA PCR  - CHLAMYDIA TRACHOMATIS PCR  - Wet prep  - Treponema Abs w Reflex to RPR and Titer    5. Screening for malignant neoplasm of cervix  - Pap imaged thin layer screen with HPV - recommended age 30 - 65 years (select HPV order below)  - HPV High Risk Types DNA Cervical    COUNSELING:   Reviewed preventive health counseling, as reflected in patient instructions       Regular exercise       Healthy diet/nutrition       Vision screening       Contraception       Family planning    Estimated body mass index is 27.03 kg/m  as calculated from the following:    Height as of this encounter: 1.689 m (5' 6.5\").    Weight as of this encounter: 77.1 kg (170 lb).    Weight management plan: Discussed healthy diet and exercise guidelines     reports that she has never smoked. She has never used smokeless tobacco.      Counseling Resources:  ATP IV Guidelines  Pooled Cohorts Equation Calculator  Breast Cancer Risk Calculator  FRAX Risk Assessment  ICSI Preventive Guidelines  Dietary Guidelines for Americans, 2010  USDA's MyPlate  ASA Prophylaxis  Lung CA Screening    KRISTY Rolon CNP  [facility]  "

## 2020-08-19 LAB
C TRACH DNA SPEC QL NAA+PROBE: NEGATIVE
CHOLEST SERPL-MCNC: 246 MG/DL
DEPRECATED CALCIDIOL+CALCIFEROL SERPL-MC: 20 UG/L (ref 20–75)
HBV SURFACE AG SERPL QL IA: NONREACTIVE
HDLC SERPL-MCNC: 58 MG/DL
HIV 1+2 AB+HIV1 P24 AG SERPL QL IA: NONREACTIVE
LDLC SERPL CALC-MCNC: 172 MG/DL
N GONORRHOEA DNA SPEC QL NAA+PROBE: NEGATIVE
NONHDLC SERPL-MCNC: 188 MG/DL
SPECIMEN SOURCE: NORMAL
SPECIMEN SOURCE: NORMAL
TRIGL SERPL-MCNC: 80 MG/DL
TSH SERPL DL<=0.005 MIU/L-ACNC: 2.4 MU/L (ref 0.4–4)

## 2020-08-20 LAB — T PALLIDUM AB SER QL: NONREACTIVE

## 2020-08-24 LAB
COPATH REPORT: NORMAL
PAP: NORMAL

## 2020-08-26 LAB
FINAL DIAGNOSIS: NORMAL
HPV HR 12 DNA CVX QL NAA+PROBE: NEGATIVE
HPV16 DNA SPEC QL NAA+PROBE: NEGATIVE
HPV18 DNA SPEC QL NAA+PROBE: NEGATIVE
SPECIMEN DESCRIPTION: NORMAL
SPECIMEN SOURCE CVX/VAG CYTO: NORMAL

## 2020-09-01 DIAGNOSIS — R19.7 DIARRHEA, UNSPECIFIED TYPE: ICD-10-CM

## 2020-09-01 PROCEDURE — 87338 HPYLORI STOOL AG IA: CPT | Performed by: NURSE PRACTITIONER

## 2020-09-01 PROCEDURE — 87209 SMEAR COMPLEX STAIN: CPT | Performed by: NURSE PRACTITIONER

## 2020-09-01 PROCEDURE — 87177 OVA AND PARASITES SMEARS: CPT | Performed by: NURSE PRACTITIONER

## 2020-09-01 NOTE — LETTER
September 2, 2020      Salina Cooleyleticia  [address]  [place] [address]        Dear ,    Your stool sample came back positive for H. Pylori.  You are positive for the h.pylori infection which is likely causing a lot of your stomach problems.  H.pylori is a bacteria that a lot of people have in their stomachs.  It is common in the [place], [place], and [place].  It could come from contaminated water supply but we are not totally sure where it comes from.  The risk of H.pylori is that is can cause gastric ulcers and gastric cancer in the future.  The good news is that we can treat it with antibiotics/antacids.  The treatment is as follows:       -Clarithromycin 1 capsule twice a day   -Amoxicillin 2 capsules twice a day   -Omeprazole 1 capsule twice a day     Take all of those exactly as prescribed for 14 days.  Your symptoms should improve.  If you still have symptoms 2-3 weeks after treatment, let us know and we can consider retesting and/or doing an endoscopy to evaluate the condition of your stomach lining.     Feel free to contact me with any questions or concerns.  Thank you for allowing me to participate in your care.         KRISTY Rolon CNP     Results for orders placed or performed in visit on 09/01/20   Helicobacter pylori Antigen Stool     Status: Abnormal   Result Value Ref Range    Helicobacter pylori Antigen Stool Positive (A) NEG^Negative   Ova and Parasite Exam Routine     Status: Abnormal    Specimen: Feces   Result Value Ref Range    Specimen Description Feces     Ova and Parasite Exam (A)      Few  Blastocystis spp. (Blastocystis hominis)  The name Blastocystis hominis contains approximately 10 different organisms, none of which   can be differentiated on the basis of morphology: some are pathogenic and some are   non-pathogenic.  If no other pathogen found, Blastocystis spp. may be the cause of patient   symptoms.      Ova and Parasite Exam Endolimax  debby cysts (A)     Ova and Parasite Exam       Cryptosporidium, Cyclospora, and Microsporidia are not readily detected by this method. A   single negative specimen does not rule out parasitic infection.

## 2020-09-02 DIAGNOSIS — A04.8 H. PYLORI INFECTION: Primary | ICD-10-CM

## 2020-09-02 LAB
H PYLORI AG STL QL IA: POSITIVE
O+P STL MICRO: ABNORMAL
SPECIMEN SOURCE: ABNORMAL

## 2020-09-02 RX ORDER — AMOXICILLIN 500 MG/1
1000 CAPSULE ORAL 2 TIMES DAILY
Qty: 56 CAPSULE | Refills: 0 | Status: SHIPPED | OUTPATIENT
Start: 2020-09-02 | End: 2020-09-16

## 2020-09-02 RX ORDER — CLARITHROMYCIN 500 MG
500 TABLET ORAL 2 TIMES DAILY
Qty: 28 TABLET | Refills: 0 | Status: SHIPPED | OUTPATIENT
Start: 2020-09-02 | End: 2020-09-16

## 2020-09-09 ENCOUNTER — NURSE TRIAGE (OUTPATIENT)
Dept: NURSING | Facility: CLINIC | Age: 30
End: 2020-09-09

## 2020-09-09 NOTE — TELEPHONE ENCOUNTER
Hpylori is positive and she was sent medication.      Last period was July 28, and recently found out she is pregnant.  Wants to know if medication is ok to take if pregnant??    Please call her.    Britni Abrams RN  [facility] Nurse Advisor

## 2020-09-10 NOTE — TELEPHONE ENCOUNTER
The recommendation is to wait until after pregnancy to treat it unless she has severe nausea and vomiting in pregnancy then her OB may decide to treat it.    KRISTY Rolon CNP

## 2020-09-10 NOTE — TELEPHONE ENCOUNTER
Patient returned call to clinic, sent to RN line.  Spoke with patient, relayed provider message response, as noted below.  Patient verbalized understanding. Writer did advise for patient to let OB provider know about positive infection too, in case this provider would want to do any different, considering patient is pregnant. Otherwise patient is understanding to wait to treat until after pregnancy. Patient agreed with full plan from provider below.  No questions at this time.    Lisa Stahl RN  [facility]/ [facility]

## 2020-09-10 NOTE — TELEPHONE ENCOUNTER
This writer attempted to contact Salina on 09/10/20      Reason for call provider message and left message.      If patient calls back:   Registered Nurse called. Follow Triage Call workflow        Stephanie Downing RN

## 2020-10-22 LAB
ABO + RH BLD: NORMAL
ABO + RH BLD: NORMAL
HBV SURFACE AG SERPL QL IA: NONREACTIVE
HIV 1+2 AB+HIV1 P24 AG SERPL QL IA: NONREACTIVE
RUBELLA ABY IGG: 3.96
RUBELLA ANTIBODY IGG QUANTITATIVE: NORMAL IU/ML
TREPONEMA ANTIBODIES: NONREACTIVE

## 2021-04-07 LAB — GROUP B STREP PCR: NEGATIVE

## 2021-04-25 ENCOUNTER — HOSPITAL ENCOUNTER (OUTPATIENT)
Facility: CLINIC | Age: 31
Discharge: HOME | End: 2021-04-25
Attending: OBSTETRICS & GYNECOLOGY | Admitting: OBSTETRICS & GYNECOLOGY
Payer: COMMERCIAL

## 2021-04-25 VITALS — TEMPERATURE: 99.3 F | RESPIRATION RATE: 16 BRPM | SYSTOLIC BLOOD PRESSURE: 120 MMHG | DIASTOLIC BLOOD PRESSURE: 74 MMHG

## 2021-04-25 LAB — RUPTURE OF FETAL MEMBRANES BY ROM PLUS: NEGATIVE

## 2021-04-25 PROCEDURE — G0463 HOSPITAL OUTPT CLINIC VISIT: HCPCS | Mod: 25

## 2021-04-25 PROCEDURE — 84112 EVAL AMNIOTIC FLUID PROTEIN: CPT | Performed by: OBSTETRICS & GYNECOLOGY

## 2021-04-25 PROCEDURE — 59025 FETAL NON-STRESS TEST: CPT

## 2021-04-25 RX ORDER — PRENATAL VIT/IRON FUM/FOLIC AC 27MG-0.8MG
1 TABLET ORAL DAILY
COMMUNITY

## 2021-04-25 RX ORDER — ONDANSETRON 2 MG/ML
4 INJECTION INTRAMUSCULAR; INTRAVENOUS EVERY 6 HOURS PRN
Status: DISCONTINUED | OUTPATIENT
Start: 2021-04-25 | End: 2021-04-25 | Stop reason: HOSPADM

## 2021-04-25 NOTE — PLAN OF CARE
"Salina is a 38+5  presenting for evaluation of labor. She states she has been having contractions on and off but they have decreased in frequency lately. When she does have them, she states she feels the need to move around and rates them a 10/10. She endorses +FM, also noting that baby's movements felt \"different\" and stronger this AM, reporting some movements made her feel rectal pressure. She notes she had diarrhea on Friday but this has resolved. She denies vaginal bleeding except some brown blood noted when she suspects she lost her mucus plug Friday. She also notes some suspected leaking of fluid while using the bathroom Saturday AM. SVE 3/50/-2. ROM+ collected and sent, negative. No contractions noted on toco, pt states she has felt \"maybe 2-3\" contractions total since monitors were placed. Repeat SVE unchanged.      21 0208   Provider Notification   Provider Name/Title Dr MARTHA Cates   Method of Notification Phone   Request Evaluate - Remote     Report to Dr Cates to include pt presence, complaints, FHR and toco readings, SVEs, ROM+ results. Pt can discharge to home and f/u in clinic as scheduled.   Pt is agreeable to plan of care and understands reasons to return to triage. Pt discharged stable and ambulatory 224.   "

## 2021-04-25 NOTE — DISCHARGE INSTRUCTIONS
Discharge Instruction for Undelivered Patients      You were seen for: Labor Assessment and Membrane Assessment  We Consulted: Dr Veronica Cates  You had (Test or Medicine):NST     Diet:   Drink 8 to 12 glasses of liquids (milk, juice, water) every day.     Activity:  Count fetal kicks everyday (see handout)  Call your doctor or nurse midwife if your baby is moving less than usual.     Call your provider if you notice:  Swelling in your face or increased swelling in your hands or legs.  Headaches that are not relieved by Tylenol (acetaminophen).  Changes in your vision (blurring: seeing spots or stars.)  Nausea (sick to your stomach) and vomiting (throwing up).   Weight gain of 5 pounds or more per week.  Heartburn that doesn't go away.  Signs of bladder infection: pain when you urinate (use the toilet), need to go more often and more urgently.  The bag of yeung (rupture of membranes) breaks, or you notice leaking in your underwear.  Bright red blood in your underwear.  Abdominal (lower belly) or stomach pain.  Second (plus) baby: Contractions (tightening) less than 10 minutes apart and getting stronger.  Increase or change in vaginal discharge (note the color and amount)  Other: ***    Follow-up:  As scheduled in the clinic        Kick Counts  It s normal to worry about your baby s health. One way you can know your baby s doing well is to record the baby s movements once a day. This is called a kick count.   Remember to take your kick count records to all your appointments with your healthcare provider.  How to count kicks    Time how long it takes you to feel 10 kicks, flutters, swishes, or rolls. Ideally, you want to feel at least 10 movements in 2 hours. You will likely feel 10 movements in less time than that.  Starting at 28 weeks, count your baby's movements daily. Follow your healthcare provider's instructions for kick counting. Here are tips for counting kicks:    Choose a time when the baby is active, such as  after a meal.     Sit comfortably or lie on your side.     The first time the baby moves, write down the time.     Count each movement until the baby has moved  10 times. This can take from 20 minutes to 2 hours.     If you haven't felt 10 kicks by the end of the second hour, wait a few hours. Then try again.    Try to do it at the same time each day.  When to call your healthcare provider  Call your healthcare provider  right away if:    You do a couple sets of kick counts during the day and your baby moves fewer than 10 times in 2 hours.    Your baby moves much less often than on the days before.    You haven't felt your baby move all day.  [employer] last reviewed this educational content on 8/1/2020 2000-2021 [employer]. All rights reserved. This information is not intended as a substitute for professional medical care. Always follow your healthcare professional's instructions.

## 2021-04-28 LAB
LABORATORY COMMENT REPORT: NORMAL
SARS-COV-2 RNA RESP QL NAA+PROBE: NEGATIVE
SARS-COV-2 RNA RESP QL NAA+PROBE: NORMAL
SPECIMEN SOURCE: NORMAL
SPECIMEN SOURCE: NORMAL

## 2021-04-28 PROCEDURE — U0003 INFECTIOUS AGENT DETECTION BY NUCLEIC ACID (DNA OR RNA); SEVERE ACUTE RESPIRATORY SYNDROME CORONAVIRUS 2 (SARS-COV-2) (CORONAVIRUS DISEASE [COVID-19]), AMPLIFIED PROBE TECHNIQUE, MAKING USE OF HIGH THROUGHPUT TECHNOLOGIES AS DESCRIBED BY CMS-2020-01-R: HCPCS | Performed by: OBSTETRICS & GYNECOLOGY

## 2021-04-28 PROCEDURE — U0005 INFEC AGEN DETEC AMPLI PROBE: HCPCS | Performed by: OBSTETRICS & GYNECOLOGY

## 2021-04-30 ENCOUNTER — HOSPITAL ENCOUNTER (INPATIENT)
Facility: CLINIC | Age: 31
LOS: 1 days | Discharge: HOME HEALTH | End: 2021-05-01
Attending: OBSTETRICS & GYNECOLOGY | Admitting: OBSTETRICS & GYNECOLOGY
Payer: COMMERCIAL

## 2021-04-30 LAB
ABO + RH BLD: NORMAL
ABO + RH BLD: NORMAL
SPECIMEN EXP DATE BLD: NORMAL
T PALLIDUM AB SER QL: NONREACTIVE

## 2021-04-30 PROCEDURE — 86901 BLOOD TYPING SEROLOGIC RH(D): CPT | Performed by: OBSTETRICS & GYNECOLOGY

## 2021-04-30 PROCEDURE — 250N000009 HC RX 250: Performed by: OBSTETRICS & GYNECOLOGY

## 2021-04-30 PROCEDURE — 86900 BLOOD TYPING SEROLOGIC ABO: CPT | Performed by: OBSTETRICS & GYNECOLOGY

## 2021-04-30 PROCEDURE — 120N000012 HC R&B POSTPARTUM

## 2021-04-30 PROCEDURE — 86780 TREPONEMA PALLIDUM: CPT | Performed by: OBSTETRICS & GYNECOLOGY

## 2021-04-30 PROCEDURE — 722N000001 HC LABOR CARE VAGINAL DELIVERY SINGLE

## 2021-04-30 PROCEDURE — 10907ZC DRAINAGE OF AMNIOTIC FLUID, THERAPEUTIC FROM PRODUCTS OF CONCEPTION, VIA NATURAL OR ARTIFICIAL OPENING: ICD-10-PCS | Performed by: OBSTETRICS & GYNECOLOGY

## 2021-04-30 PROCEDURE — 36415 COLL VENOUS BLD VENIPUNCTURE: CPT | Performed by: OBSTETRICS & GYNECOLOGY

## 2021-04-30 PROCEDURE — 258N000003 HC RX IP 258 OP 636: Performed by: OBSTETRICS & GYNECOLOGY

## 2021-04-30 PROCEDURE — 250N000013 HC RX MED GY IP 250 OP 250 PS 637: Performed by: OBSTETRICS & GYNECOLOGY

## 2021-04-30 RX ORDER — OXYTOCIN/0.9 % SODIUM CHLORIDE 30/500 ML
1-24 PLASTIC BAG, INJECTION (ML) INTRAVENOUS CONTINUOUS
Status: DISCONTINUED | OUTPATIENT
Start: 2021-04-30 | End: 2021-04-30

## 2021-04-30 RX ORDER — IBUPROFEN 400 MG/1
800 TABLET, FILM COATED ORAL EVERY 6 HOURS PRN
Status: DISCONTINUED | OUTPATIENT
Start: 2021-04-30 | End: 2021-05-01 | Stop reason: HOSPADM

## 2021-04-30 RX ORDER — CARBOPROST TROMETHAMINE 250 UG/ML
250 INJECTION, SOLUTION INTRAMUSCULAR
Status: DISCONTINUED | OUTPATIENT
Start: 2021-04-30 | End: 2021-04-30

## 2021-04-30 RX ORDER — METHYLERGONOVINE MALEATE 0.2 MG/ML
200 INJECTION INTRAVENOUS
Status: DISCONTINUED | OUTPATIENT
Start: 2021-04-30 | End: 2021-04-30

## 2021-04-30 RX ORDER — BISACODYL 10 MG
10 SUPPOSITORY, RECTAL RECTAL DAILY PRN
Status: DISCONTINUED | OUTPATIENT
Start: 2021-05-02 | End: 2021-05-01 | Stop reason: HOSPADM

## 2021-04-30 RX ORDER — NALOXONE HYDROCHLORIDE 0.4 MG/ML
0.4 INJECTION, SOLUTION INTRAMUSCULAR; INTRAVENOUS; SUBCUTANEOUS
Status: DISCONTINUED | OUTPATIENT
Start: 2021-04-30 | End: 2021-04-30

## 2021-04-30 RX ORDER — OXYTOCIN 10 [USP'U]/ML
10 INJECTION, SOLUTION INTRAMUSCULAR; INTRAVENOUS
Status: DISCONTINUED | OUTPATIENT
Start: 2021-04-30 | End: 2021-05-01 | Stop reason: HOSPADM

## 2021-04-30 RX ORDER — OXYCODONE AND ACETAMINOPHEN 5; 325 MG/1; MG/1
1 TABLET ORAL
Status: DISCONTINUED | OUTPATIENT
Start: 2021-04-30 | End: 2021-04-30

## 2021-04-30 RX ORDER — AMOXICILLIN 250 MG
1 CAPSULE ORAL 2 TIMES DAILY
Status: DISCONTINUED | OUTPATIENT
Start: 2021-04-30 | End: 2021-05-01 | Stop reason: HOSPADM

## 2021-04-30 RX ORDER — ACETAMINOPHEN 325 MG/1
650 TABLET ORAL EVERY 4 HOURS PRN
Status: DISCONTINUED | OUTPATIENT
Start: 2021-04-30 | End: 2021-05-01 | Stop reason: HOSPADM

## 2021-04-30 RX ORDER — OXYTOCIN 10 [USP'U]/ML
10 INJECTION, SOLUTION INTRAMUSCULAR; INTRAVENOUS
Status: DISCONTINUED | OUTPATIENT
Start: 2021-04-30 | End: 2021-04-30

## 2021-04-30 RX ORDER — IBUPROFEN 400 MG/1
800 TABLET, FILM COATED ORAL
Status: DISCONTINUED | OUTPATIENT
Start: 2021-04-30 | End: 2021-04-30

## 2021-04-30 RX ORDER — MODIFIED LANOLIN
OINTMENT (GRAM) TOPICAL
Status: DISCONTINUED | OUTPATIENT
Start: 2021-04-30 | End: 2021-05-01 | Stop reason: HOSPADM

## 2021-04-30 RX ORDER — TRANEXAMIC ACID 10 MG/ML
1 INJECTION, SOLUTION INTRAVENOUS EVERY 30 MIN PRN
Status: DISCONTINUED | OUTPATIENT
Start: 2021-04-30 | End: 2021-04-30

## 2021-04-30 RX ORDER — ONDANSETRON 2 MG/ML
4 INJECTION INTRAMUSCULAR; INTRAVENOUS EVERY 6 HOURS PRN
Status: DISCONTINUED | OUTPATIENT
Start: 2021-04-30 | End: 2021-04-30

## 2021-04-30 RX ORDER — OXYTOCIN/0.9 % SODIUM CHLORIDE 30/500 ML
100-340 PLASTIC BAG, INJECTION (ML) INTRAVENOUS CONTINUOUS PRN
Status: COMPLETED | OUTPATIENT
Start: 2021-04-30 | End: 2021-04-30

## 2021-04-30 RX ORDER — OXYTOCIN/0.9 % SODIUM CHLORIDE 30/500 ML
340 PLASTIC BAG, INJECTION (ML) INTRAVENOUS CONTINUOUS PRN
Status: DISCONTINUED | OUTPATIENT
Start: 2021-04-30 | End: 2021-05-01 | Stop reason: HOSPADM

## 2021-04-30 RX ORDER — NALOXONE HYDROCHLORIDE 0.4 MG/ML
0.2 INJECTION, SOLUTION INTRAMUSCULAR; INTRAVENOUS; SUBCUTANEOUS
Status: DISCONTINUED | OUTPATIENT
Start: 2021-04-30 | End: 2021-04-30

## 2021-04-30 RX ORDER — TRANEXAMIC ACID 10 MG/ML
1 INJECTION, SOLUTION INTRAVENOUS EVERY 30 MIN PRN
Status: DISCONTINUED | OUTPATIENT
Start: 2021-04-30 | End: 2021-05-01 | Stop reason: HOSPADM

## 2021-04-30 RX ORDER — SODIUM CHLORIDE, SODIUM LACTATE, POTASSIUM CHLORIDE, CALCIUM CHLORIDE 600; 310; 30; 20 MG/100ML; MG/100ML; MG/100ML; MG/100ML
INJECTION, SOLUTION INTRAVENOUS CONTINUOUS
Status: DISCONTINUED | OUTPATIENT
Start: 2021-04-30 | End: 2021-04-30

## 2021-04-30 RX ORDER — LIDOCAINE 40 MG/G
CREAM TOPICAL
Status: DISCONTINUED | OUTPATIENT
Start: 2021-04-30 | End: 2021-04-30

## 2021-04-30 RX ORDER — OXYTOCIN/0.9 % SODIUM CHLORIDE 30/500 ML
100 PLASTIC BAG, INJECTION (ML) INTRAVENOUS CONTINUOUS
Status: DISCONTINUED | OUTPATIENT
Start: 2021-04-30 | End: 2021-05-01 | Stop reason: HOSPADM

## 2021-04-30 RX ORDER — MISOPROSTOL 200 UG/1
800 TABLET ORAL
Status: DISCONTINUED | OUTPATIENT
Start: 2021-04-30 | End: 2021-05-01 | Stop reason: HOSPADM

## 2021-04-30 RX ORDER — AMOXICILLIN 250 MG
2 CAPSULE ORAL 2 TIMES DAILY
Status: DISCONTINUED | OUTPATIENT
Start: 2021-04-30 | End: 2021-05-01 | Stop reason: HOSPADM

## 2021-04-30 RX ORDER — HYDROCORTISONE 2.5 %
CREAM (GRAM) TOPICAL 3 TIMES DAILY PRN
Status: DISCONTINUED | OUTPATIENT
Start: 2021-04-30 | End: 2021-05-01 | Stop reason: HOSPADM

## 2021-04-30 RX ORDER — ACETAMINOPHEN 325 MG/1
650 TABLET ORAL EVERY 4 HOURS PRN
Status: DISCONTINUED | OUTPATIENT
Start: 2021-04-30 | End: 2021-04-30

## 2021-04-30 RX ORDER — METHYLERGONOVINE MALEATE 0.2 MG/ML
200 INJECTION INTRAVENOUS
Status: DISCONTINUED | OUTPATIENT
Start: 2021-04-30 | End: 2021-05-01 | Stop reason: HOSPADM

## 2021-04-30 RX ADMIN — Medication 340 ML/HR: at 16:15

## 2021-04-30 RX ADMIN — ACETAMINOPHEN 650 MG: 325 TABLET, FILM COATED ORAL at 16:46

## 2021-04-30 RX ADMIN — ACETAMINOPHEN 650 MG: 325 TABLET, FILM COATED ORAL at 22:32

## 2021-04-30 RX ADMIN — Medication 1 MILLI-UNITS/MIN: at 12:00

## 2021-04-30 RX ADMIN — IBUPROFEN 800 MG: 400 TABLET ORAL at 22:32

## 2021-04-30 RX ADMIN — IBUPROFEN 800 MG: 400 TABLET ORAL at 16:46

## 2021-04-30 RX ADMIN — SENNOSIDES AND DOCUSATE SODIUM 1 TABLET: 8.6; 5 TABLET ORAL at 22:32

## 2021-04-30 RX ADMIN — SODIUM CHLORIDE, POTASSIUM CHLORIDE, SODIUM LACTATE AND CALCIUM CHLORIDE: 600; 310; 30; 20 INJECTION, SOLUTION INTRAVENOUS at 12:00

## 2021-04-30 ASSESSMENT — ACTIVITIES OF DAILY LIVING (ADL)
FALL_HISTORY_WITHIN_LAST_SIX_MONTHS: NO
WALKING_OR_CLIMBING_STAIRS_DIFFICULTY: NO
DIFFICULTY_COMMUNICATING: NO
DRESSING/BATHING_DIFFICULTY: NO
TOILETING_ISSUES: NO
DIFFICULTY_EATING/SWALLOWING: NO

## 2021-04-30 NOTE — L&D DELIVERY NOTE
2021       of viable male infant in RAE presentation, no nuchal cord, and shoulders delivered without difficulty.  Please see dictated delivery summary for further details.        THA MARTINS MD

## 2021-05-01 VITALS
RESPIRATION RATE: 18 BRPM | SYSTOLIC BLOOD PRESSURE: 105 MMHG | DIASTOLIC BLOOD PRESSURE: 61 MMHG | HEART RATE: 62 BPM | TEMPERATURE: 98.3 F

## 2021-05-01 LAB — HGB BLD-MCNC: 9.8 G/DL (ref 11.7–15.7)

## 2021-05-01 PROCEDURE — 36415 COLL VENOUS BLD VENIPUNCTURE: CPT | Performed by: OBSTETRICS & GYNECOLOGY

## 2021-05-01 PROCEDURE — 85018 HEMOGLOBIN: CPT | Performed by: OBSTETRICS & GYNECOLOGY

## 2021-05-01 PROCEDURE — 250N000013 HC RX MED GY IP 250 OP 250 PS 637: Performed by: OBSTETRICS & GYNECOLOGY

## 2021-05-01 RX ORDER — IBUPROFEN 800 MG/1
800 TABLET, FILM COATED ORAL EVERY 6 HOURS PRN
Qty: 20 TABLET | Refills: 0 | Status: SHIPPED | OUTPATIENT
Start: 2021-05-01

## 2021-05-01 RX ADMIN — IBUPROFEN 800 MG: 400 TABLET ORAL at 08:11

## 2021-05-01 RX ADMIN — ACETAMINOPHEN 650 MG: 325 TABLET, FILM COATED ORAL at 16:25

## 2021-05-01 RX ADMIN — IBUPROFEN 800 MG: 400 TABLET ORAL at 16:25

## 2021-05-01 RX ADMIN — SENNOSIDES AND DOCUSATE SODIUM 1 TABLET: 8.6; 5 TABLET ORAL at 08:11

## 2021-05-01 RX ADMIN — ACETAMINOPHEN 650 MG: 325 TABLET, FILM COATED ORAL at 08:11

## 2021-05-01 NOTE — PROGRESS NOTES
PPD1  Feels well, desires discharge  /61 (BP Location: Right arm)   Pulse 62   Temp 98.3  F (36.8  C) (Oral)   Resp 18   LMP 2020 (Exact Date)   Breastfeeding Unknown    NAD  Abd Soft, FF  Ext NT    PPD1 s/p   Discharge to home  F/u 2 and 8 weeks    Lesli Cates MD

## 2021-05-01 NOTE — PLAN OF CARE
Patient arrived to floor to room 414 via w/c with baby in arms and  at side at 2000.  Report given from Agustina PABLO at 1850 and baby bands checked with labor RN upon coming to floor.  Both patient and father of baby oriented to floor/room and cares/safety of baby.  Patient has stable vitals.  Fundus firm at U/1. Scant rubra flow.  Has voided once since delivery and tylenol and ibuprofen given prior to coming to floor. Planning to breast feed baby boy.

## 2021-05-01 NOTE — PLAN OF CARE
Data: Salina Ulloa transferred to 414 via wheelchair at 1950. Baby transferred via parent's arms.  Action: Receiving unit notified of transfer: Yes. Patient and family notified of room change.  Belongings sent to receiving unit. Accompanied by Registered Nurse. Oriented patient to surroundings. Call light within reach. ID bands double-checked with receiving RN.  Response: Patient tolerated transfer and is stable.

## 2021-05-01 NOTE — PLAN OF CARE
VSS, fundus firm with no free flow or clots.  Pt is voiding without issue.  Would like to discharge today if possible.  Independent with  cares.  Breast feeding and bottle feeding .  Enc to call with needs, questions and concerns.      D: VSS, assessments WDL.   I: Pt. received complete discharge paperwork and home medications, RX for ibuprofen sent with pt.  Pt. was given times of last dose for all discharge medications in writing on discharge medication sheets.  Discharge teaching included home medication, pain management, activity restrictions, postpartum cares, and signs and symptoms of infection.    A: Discharge outcomes on care plan met.  Mother states understanding and comfort with self and baby cares.  P: Pt. discharged to home.  Pt. was discharged with baby, and bands were checked at time of discharge.  Pt. was accompanied by , nurse and baby, and left with personal belongings.  Home care referral made.  Pt. to follow up with OB per MD order.  Pt. had no further questions at the time of discharge and no unmet needs were identified.

## 2021-05-01 NOTE — PLAN OF CARE
VSS on RA.  Fundus firm, midline, U/1.  Scant lochia rubra.  Pt is able to empty bladder independently.  Voiding adequately.  Up independently.  Pain managed w/Tylenol and Ibuprofen.  Pt breastfeeding independently.  Supplementing w/Similac formula using bottle.  Bonding well w/infant.  Independent w/infant cares.  Spouse supportive at bedside.  Nursing to continue to monitor.

## 2021-05-01 NOTE — LACTATION NOTE
"Initial and discharge visit with Salina and baby boy. This is Salina's third baby and she says breastfeeding is going well. No questions or concerns for LC at time of visit.      Parents educated to \"typical\"  feeding patterns/behavior: Day 1 sleepiness (birthday nap) through cluster-feeding on day (night) 2. Educated on nutritive vs non-nutritive suckling patterns. Showed how to record infant feedings along with voids and stools in the provided feeding log.     Discussed physiology of milk production from colostrum through milk coming in and how the breasts should begin to feel \"heavy or full\" between day 3-5. Encouraged reviewing the provided \"Guide to Postpartum and [place] Care\" handbook for topics including engorgement, plugged milk ducts, mastitis, safe sleep, and safety of baby. Discussed normal infant weight loss and when infant should be back to birth weight.     Feeding plan recommendations: provide unlimited, on-demand breast feedings: At least 8-12 times/24 hours (reviewed early feeding cues). Encouraged on-going use of a feeding log or sloane to record feedings along with void/stool patterns. Avoid pacifiers (until 1 month of age per AAP guidelines) and supplementation with formula unless medically indicated. Follow up with Pediatrician as requested and encouraged lactation follow up. Reviewed outpatient lactation resources. Appreciative of visit.    Ruba Finn RN, IBCLC            "

## 2021-05-01 NOTE — L&D DELIVERY NOTE
Delivery Date: 2021    DELIVERY SUMMARY:  The patient is a 30-year-old G3, P2-0-0-2 who presented to Labor and Delivery at 39 weeks and 3 days for elective induction of labor.  Of note, her prenatal care had been uncomplicated other than history of GERD and H. pylori.  Upon arrival to Labor and Delivery, she was found to be 3 cm dilated, 70% effaced and -2 station.  Amniotomy was performed with clear fluid noted.  After a few hours, she was not noted to be donald, and therefore Pitocin was started per protocol.  She made steady cervical change with category 1 tracing.  Of note, her group B strep was noted to be negative and COVID-19 testing negative.  At approximately 1610, she was found to be completely dilated.  She started pushing at 1610, and within 1 minute she delivered a viable male infant in left occiput anterior presentation.  The infant was placed on maternal abdomen, was bulb suctioned, and the cord was clamped and cut after 1 minute.  Cord blood was then obtained.  Placenta then delivered spontaneously intact with a 3-vessel cord.  Uterus became firm with fundal massage and IV Pitocin.  Inspection revealed a very superficial abrasion of the posterior fourchette that did not require any repair.  Reinspection revealed excellent hemostasis and no further lacerations.      ESTIMATED BLOOD LOSS:  Less than 100 mL.      COUNTS:  All sponge and instrument counts were correct x 2.     Infant Apgars are pending.  Weights are pending.    Tashi Peralta MD        D: 2021   T: 2021   MT: [facility]    Name:     JEFFREY JORDAN  MRN:      [id number]        Account:       [id number]   :      1990           Delivery Date: 2021     Document: [id number]